# Patient Record
Sex: MALE | Race: WHITE
[De-identification: names, ages, dates, MRNs, and addresses within clinical notes are randomized per-mention and may not be internally consistent; named-entity substitution may affect disease eponyms.]

---

## 2017-05-30 ENCOUNTER — HOSPITAL ENCOUNTER (EMERGENCY)
Dept: HOSPITAL 53 - M ED | Age: 22
LOS: 1 days | Discharge: HOME | End: 2017-05-31
Payer: MEDICARE

## 2017-05-30 VITALS — WEIGHT: 150 LBS | BODY MASS INDEX: 21 KG/M2 | HEIGHT: 71 IN

## 2017-05-30 DIAGNOSIS — F17.220: ICD-10-CM

## 2017-05-30 DIAGNOSIS — F41.0: Primary | ICD-10-CM

## 2017-05-30 DIAGNOSIS — J45.901: ICD-10-CM

## 2017-05-30 LAB
BASOPHILS # BLD AUTO: 0 K/MM3 (ref 0–0.2)
BASOPHILS NFR BLD AUTO: 0.3 % (ref 0–1)
EOSINOPHIL # BLD AUTO: 0.1 K/MM3 (ref 0–0.5)
EOSINOPHIL NFR BLD AUTO: 0.6 % (ref 0–3)
ERYTHROCYTE [DISTWIDTH] IN BLOOD BY AUTOMATED COUNT: 12.3 % (ref 11.5–14.5)
LARGE UNSTAINED CELL #: 0.1 K/MM3 (ref 0–0.4)
LARGE UNSTAINED CELL %: 1.1 % (ref 0–4)
LYMPHOCYTES # BLD AUTO: 1.4 K/MM3 (ref 1.5–6.5)
LYMPHOCYTES NFR BLD AUTO: 13.2 % (ref 24–44)
MCH RBC QN AUTO: 30.8 PG (ref 27–33)
MCHC RBC AUTO-ENTMCNC: 34.9 G/DL (ref 32–36.5)
MCV RBC AUTO: 88.2 FL (ref 80–96)
MONOCYTES # BLD AUTO: 0.7 K/MM3 (ref 0–0.8)
MONOCYTES NFR BLD AUTO: 6.7 % (ref 0–5)
NEUTROPHILS # BLD AUTO: 8 K/MM3 (ref 1.8–7.7)
NEUTROPHILS NFR BLD AUTO: 78.1 % (ref 36–66)
PLATELET # BLD AUTO: 169 K/MM3 (ref 150–450)
WBC # BLD AUTO: 10.2 K/MM3 (ref 4–10)

## 2017-05-30 NOTE — REP
Clinical:  Trauma.

 

Technique:  AP, lateral, bilateral oblique views of the right ankle.

 

Findings:

Mild lateral soft tissue swelling suggested.  No acute fracture dislocation.

Joint spaces and ankle mortise are intact.

 

Impression:

Mild swelling.  No acute fracture or dislocation.

 

 

Signed by

Davey Alvarado MD 05/30/2017 10:48 P

## 2017-05-30 NOTE — REP
Clinical:  Dyspnea and cough .

 

Comparison: 04/24/2016 .

 

Technique:  PA and lateral.

 

Findings:

The mediastinum and cardiac silhouette are normal.  The lung fields are clear and

without acute consolidation, effusion, or pneumothorax.  The skeletal structures

are intact and normal.

 

Impression:

1.   No acute cardiopulmonary process.

 

 

Signed by

Davey Alvarado MD 05/30/2017 10:48 P

## 2017-05-31 LAB
ANION GAP SERPL CALC-SCNC: 9 MEQ/L (ref 8–16)
BUN SERPL-MCNC: 8 MG/DL (ref 7–18)
CALCIUM SERPL-MCNC: 8.6 MG/DL (ref 8.5–10.1)
CHLORIDE SERPL-SCNC: 108 MEQ/L (ref 98–107)
CO2 SERPL-SCNC: 26 MEQ/L (ref 21–32)
CREAT SERPL-MCNC: 1.04 MG/DL (ref 0.7–1.3)
GFR SERPL CREATININE-BSD FRML MDRD: > 60 ML/MIN/{1.73_M2} (ref 60–?)
GLUCOSE SERPL-MCNC: 108 MG/DL (ref 70–105)
METHADONE UR QL SCN: NEGATIVE
POTASSIUM SERPL-SCNC: 3.3 MEQ/L (ref 3.5–5.1)
SODIUM SERPL-SCNC: 143 MEQ/L (ref 136–145)

## 2018-06-08 ENCOUNTER — HOSPITAL ENCOUNTER (EMERGENCY)
Dept: HOSPITAL 53 - M ED | Age: 23
LOS: 1 days | Discharge: HOME | End: 2018-06-09
Payer: MEDICARE

## 2018-06-08 DIAGNOSIS — F41.1: Primary | ICD-10-CM

## 2018-06-08 DIAGNOSIS — F79: ICD-10-CM

## 2018-06-08 DIAGNOSIS — Z91.040: ICD-10-CM

## 2018-06-08 DIAGNOSIS — Z79.899: ICD-10-CM

## 2018-06-08 DIAGNOSIS — M25.562: ICD-10-CM

## 2018-06-08 PROCEDURE — 73560 X-RAY EXAM OF KNEE 1 OR 2: CPT

## 2018-06-08 RX ADMIN — ACETAMINOPHEN 1 MG: 325 TABLET ORAL at 23:46
